# Patient Record
Sex: FEMALE | Race: WHITE
[De-identification: names, ages, dates, MRNs, and addresses within clinical notes are randomized per-mention and may not be internally consistent; named-entity substitution may affect disease eponyms.]

---

## 2018-02-19 ENCOUNTER — HOSPITAL ENCOUNTER (OUTPATIENT)
Dept: HOSPITAL 45 - C.LABSPEC | Age: 28
Discharge: HOME | End: 2018-02-19
Attending: OBSTETRICS & GYNECOLOGY
Payer: COMMERCIAL

## 2018-02-19 DIAGNOSIS — Z34.01: Primary | ICD-10-CM

## 2018-02-22 ENCOUNTER — HOSPITAL ENCOUNTER (OUTPATIENT)
Dept: HOSPITAL 45 - C.PAPS | Age: 28
Discharge: HOME | End: 2018-02-22
Attending: OBSTETRICS & GYNECOLOGY
Payer: COMMERCIAL

## 2018-02-22 ENCOUNTER — HOSPITAL ENCOUNTER (OUTPATIENT)
Dept: HOSPITAL 45 - C.LABSPEC | Age: 28
Discharge: HOME | End: 2018-02-22
Attending: OBSTETRICS & GYNECOLOGY
Payer: COMMERCIAL

## 2018-02-22 ENCOUNTER — HOSPITAL ENCOUNTER (OUTPATIENT)
Dept: HOSPITAL 45 - C.LAB1850 | Age: 28
Discharge: HOME | End: 2018-02-22
Attending: OBSTETRICS & GYNECOLOGY
Payer: COMMERCIAL

## 2018-02-22 DIAGNOSIS — Z34.01: Primary | ICD-10-CM

## 2018-02-22 LAB
BASOPHILS # BLD: 0.02 K/UL (ref 0–0.2)
BASOPHILS NFR BLD: 0.3 %
EOS ABS #: 0.09 K/UL (ref 0–0.5)
EOSINOPHIL NFR BLD AUTO: 209 K/UL (ref 130–400)
HCT VFR BLD CALC: 39.1 % (ref 37–47)
HGB BLD-MCNC: 13.7 G/DL (ref 12–16)
IG#: 0.03 K/UL (ref 0–0.02)
IMM GRANULOCYTES NFR BLD AUTO: 22.6 %
LYMPHOCYTES # BLD: 1.37 K/UL (ref 1.2–3.4)
MCH RBC QN AUTO: 30.3 PG (ref 25–34)
MCHC RBC AUTO-ENTMCNC: 35 G/DL (ref 32–36)
MCV RBC AUTO: 86.5 FL (ref 80–100)
MONO ABS #: 0.51 K/UL (ref 0.11–0.59)
MONOCYTES NFR BLD: 8.4 %
NEUT ABS #: 4.05 K/UL (ref 1.4–6.5)
NEUTROPHILS # BLD AUTO: 1.5 %
NEUTROPHILS NFR BLD AUTO: 66.7 %
PMV BLD AUTO: 10.3 FL (ref 7.4–10.4)
RED CELL DISTRIBUTION WIDTH CV: 12.8 % (ref 11.5–14.5)
RED CELL DISTRIBUTION WIDTH SD: 40.5 FL (ref 36.4–46.3)
WBC # BLD AUTO: 6.07 K/UL (ref 4.8–10.8)

## 2018-02-28 NOTE — CODING QUERY NO DIAGNOSIS
:  1990



TREATMENT RENDERED WITHOUT A DIAGNOSIS                                                  



To promote full compliance with coding requirements relating to patient care, physician 
participation is requested in all cases of  uncertainty.  Please assist us with 
providing a diagnosis/symptom for the test(s) below:



A diagnosis/symptom was not documented on your Order.  A valid diagnosis/symptom is 
required to bill all insurances.



**Please remember that we are unable to code a diagnosis of rule out, probable, possible, 
questionable, or suspected.  



Tests that require a diagnosis:

DOS:  16



* CERVIX/ENDOCERVIX THIN PREP      DIAGNOSIS:













Provider Signature: _____________________________ Date: _________



Thank you  

Eladia Marquez

Health Information Management

Phone:  [phone]

Fax:  [phone]



Once completed, please kindly fax back to [phone]



For questions please call [phone]

## 2018-04-19 ENCOUNTER — HOSPITAL ENCOUNTER (OUTPATIENT)
Dept: HOSPITAL 45 - C.LAB1850 | Age: 28
Discharge: HOME | End: 2018-04-19
Attending: OBSTETRICS & GYNECOLOGY
Payer: COMMERCIAL

## 2018-04-19 DIAGNOSIS — Z34.02: Primary | ICD-10-CM

## 2018-08-06 ENCOUNTER — HOSPITAL ENCOUNTER (OUTPATIENT)
Dept: HOSPITAL 45 - C.LAB1850 | Age: 28
Discharge: HOME | End: 2018-08-06
Attending: OBSTETRICS & GYNECOLOGY
Payer: COMMERCIAL

## 2018-08-06 ENCOUNTER — HOSPITAL ENCOUNTER (INPATIENT)
Dept: HOSPITAL 45 - C.OPB | Age: 28
LOS: 1 days | Discharge: OTHER INSTITUTION | DRG: 781 | End: 2018-08-07
Attending: OBSTETRICS & GYNECOLOGY | Admitting: OBSTETRICS & GYNECOLOGY
Payer: COMMERCIAL

## 2018-08-06 VITALS
BODY MASS INDEX: 33.79 KG/M2 | WEIGHT: 202.83 LBS | HEIGHT: 65 IN | WEIGHT: 202.83 LBS | BODY MASS INDEX: 33.79 KG/M2 | HEIGHT: 65 IN

## 2018-08-06 DIAGNOSIS — Z3A.32: ICD-10-CM

## 2018-08-06 DIAGNOSIS — Z3A.00: ICD-10-CM

## 2018-08-06 DIAGNOSIS — O16.3: Primary | ICD-10-CM

## 2018-08-06 DIAGNOSIS — O14.13: Primary | ICD-10-CM

## 2018-08-06 LAB
ALBUMIN SERPL-MCNC: 2.4 GM/DL (ref 3.4–5)
ALP SERPL-CCNC: 136 U/L (ref 45–117)
ALT SERPL-CCNC: 38 U/L (ref 12–78)
ALT SERPL-CCNC: 70 U/L (ref 12–78)
AST SERPL-CCNC: 39 U/L (ref 15–37)
AST SERPL-CCNC: 89 U/L (ref 15–37)
BASOPHILS # BLD: 0.05 K/UL (ref 0–0.2)
BASOPHILS NFR BLD: 0.5 %
CREAT SERPL-MCNC: 0.64 MG/DL (ref 0.6–1.2)
EOS ABS #: 0.1 K/UL (ref 0–0.5)
EOSINOPHIL NFR BLD AUTO: 125 K/UL (ref 130–400)
EOSINOPHIL NFR BLD AUTO: 144 K/UL (ref 130–400)
HCT VFR BLD CALC: 35.7 % (ref 37–47)
HCT VFR BLD CALC: 36.6 % (ref 37–47)
HGB BLD-MCNC: 12.6 G/DL (ref 12–16)
HGB BLD-MCNC: 12.7 G/DL (ref 12–16)
IG#: 0.1 K/UL (ref 0–0.02)
IMM GRANULOCYTES NFR BLD AUTO: 8.3 %
INR PPP: 0.9 (ref 0.9–1.1)
LYMPHOCYTES # BLD: 0.91 K/UL (ref 1.2–3.4)
MCH RBC QN AUTO: 31 PG (ref 25–34)
MCH RBC QN AUTO: 31.8 PG (ref 25–34)
MCHC RBC AUTO-ENTMCNC: 34.4 G/DL (ref 32–36)
MCHC RBC AUTO-ENTMCNC: 35.6 G/DL (ref 32–36)
MCV RBC AUTO: 89.3 FL (ref 80–100)
MCV RBC AUTO: 89.9 FL (ref 80–100)
MONO ABS #: 0.73 K/UL (ref 0.11–0.59)
MONOCYTES NFR BLD: 6.7 %
NEUT ABS #: 9.07 K/UL (ref 1.4–6.5)
NEUTROPHILS # BLD AUTO: 0.9 %
NEUTROPHILS NFR BLD AUTO: 82.7 %
PMV BLD AUTO: 11 FL (ref 7.4–10.4)
PMV BLD AUTO: 11.8 FL (ref 7.4–10.4)
PROT SERPL-MCNC: 6.2 GM/DL (ref 6.4–8.2)
PTT PATIENT: 25.9 SECONDS (ref 21–31)
RED CELL DISTRIBUTION WIDTH CV: 13.3 % (ref 11.5–14.5)
RED CELL DISTRIBUTION WIDTH CV: 13.6 % (ref 11.5–14.5)
RED CELL DISTRIBUTION WIDTH SD: 43.4 FL (ref 36.4–46.3)
RED CELL DISTRIBUTION WIDTH SD: 44.3 FL (ref 36.4–46.3)
WBC # BLD AUTO: 10.96 K/UL (ref 4.8–10.8)
WBC # BLD AUTO: 8.75 K/UL (ref 4.8–10.8)

## 2018-08-07 NOTE — HISTORY & PHYSICAL EXAMINATION
DATE OF ADMISSION:  2018

 

PRINCIPAL DIAGNOSIS:  Severe preeclampsia at 32 weeks' gestation.

 

HISTORY OF PRESENT ILLNESS:  The patient is a 28-year-old  1, P0 white

female, EDC of 2018, who presented to the office today for usual OB

visit with a pressure of 140/78.  She also had 1+ protein in her urine.  Her

pressure was 142/78 as well she has also had a 14-pound weight gain since her

last visit 2 weeks ago.  She was started on a 24-hour urine collection.  PIH

labs were drawn at that time.  There was a mild elevation of her ALT and

platelets were at 144,000 earlier today.  She called with sudden onset of

upper back pain, epigastric pain, and nausea.  No headache, no visual changes

noted by the patient.  She arrived here in labor and delivery.  Her pressures

are running 175s/90s and she has 3+ proteinuria.  She continues to have the

epigastric pain, but no other PIH symptoms.  Labs otherwise today, AST has

now climbed to 89 and had been 39, platelets are 125,000 and were 144,000

earlier.  PT and PTT are normal.  Serum creatinine is also normal.  The

patient will be transferred to [facility] for further evaluation

and treatment because of the  nature of her pregnancy.  She

understands why we are sending her and she has been accepted by Dr. aMrina Armstrong, maternal fetal medicine doctor at [facility].

 

PAST MEDICAL HISTORY:  Unremarkable.

 

PAST SURGICAL HISTORY:  Remarkable just for tonsils and dental surgery.

 

ALLERGIES:  She has no known drug allergies.

 

MEDICATIONS:  Prenatal vitamin.

 

OB/GYN HISTORY:  No history of PID, VD, or herpes.  Pap smears have been

within normal limits.  Dating for this pregnancy was confirmed at 8 weeks'

gestation.

 

PRENATAL HISTORY:  Blood type is A positive, antibody screen is negative. 

Rubella is immune.  RPR is nonreactive.  Hepatitis is negative.  HIV is

negative.  Chlamydia and GC are negative.  Glucolas have been within normal

limits.  Anatomy scan was complete and normal.  Hemoglobin at 28 weeks was

13.2, hematocrit of 38.4.  Today, hemoglobin is 12.7, hematocrit 35.7,

platelet count is a 125,000.

 

SOCIAL HISTORY:  She does not smoke or drink.

 

FAMILY HISTORY:  Noncontributory.

 

PHYSICAL EXAMINATION:

VITAL SIGNS:  As mentioned above, blood pressures have been running 170s/90s

with 3+ proteinuria.

LUNGS:  Clear to auscultation.

HEART:  Regular rate and rhythm.  No murmurs or gallops.

ABDOMEN:  Nontender.  There is no CVA tenderness.  Fundus is nontender and

gravid consistent with a 32-week gestation.

PELVIC:  Deferred.

EXTREMITIES:  With 1+ edema and brisk patellar reflexes.

 -Double footling breech presentation by bedside ultrasound.

ASSESSMENT AND PLAN:  A 28-year-old with severe preeclampsia, presenting at

32 weeks' gestation, now magnesium sulfate has been begun.  She will receive

betamethasone for her initial dose before she leaves for [place] and transfer

will be expedited either by ambulance or by helicopter if it is going to take

more than 2 hours to get an ambulance transport.  We will use either

nifedipine p.o., which she has received already, but also hydralazine 5 mg or

10 mg dose if needed during her trip to [place].

 

 

 

MTDD

## 2018-08-10 NOTE — DISCHARGE SUMMARY
DIAGNOSIS:  Severe preeclampsia at 32 weeks gestation.

 

HISTORY:  The patient is a 28-year-old  1, P0 white female, EDC of

2018, who presented to the office on the day of admission for her usual

OB visit with blood pressure 140/78.  She also had 1+ protein in her urine. 

She has had also a 14-pound weight gain since her last visit 2 weeks ago. 

She called later in the evening with sudden onset of pain between her

shoulder blades that wrapped around to the epigastric area.  She presented to

labor and delivery.  Her pressures were now 175/90s and she had 3+

proteinuria.  She continued to have epigastric pain, but no other PIH

symptoms.  Her labs showed that her AST had elevated, platelets were 125,000,

which were down from 144,000 earlier in the day.  Because of the presence of

what appears to be severe preeclampsia, [facility] was contacted

for transfer for further evaluation and treatment because she is only 32

weeks pregnant.  The transfer is accepted by Dr. Marina Armstrong who is a

maternal fetal medicine doctor at [facility].  After attempting

to get an ambulance transfer over 2 hours and was unsuccessful, the patient

was life-flighted to [facility].